# Patient Record
(demographics unavailable — no encounter records)

---

## 2025-04-25 NOTE — HISTORY OF PRESENT ILLNESS
[de-identified] : As per mom, pt has been having diarrhea since Saturday, and sounds congested. Decreased appetite  [FreeTextEntry6] : New patient, no significant PMHx or PSHx. Was seeing another pediatrician prior.    Symptoms started 4/19 Watery diarrhea, about 8x/day Mucous No blood Spits occasionally, no recent change Drinks formula, was taking 6.5 ounces, now taking 6 ounces, changed over the last few weeks Normal UOP No fever Congestion x1 month, started  a month ago, no recent changes Mom notes stomach bug was going around at

## 2025-04-25 NOTE — DISCUSSION/SUMMARY
[FreeTextEntry1] : - Discussed maintaining adequate hydration - Can try probiotic - Script given for GI PCR if diarrhea continues - Return PRN new or worsening symptoms

## 2025-04-25 NOTE — HISTORY OF PRESENT ILLNESS
[de-identified] : As per mom, pt has been having diarrhea since Saturday, and sounds congested. Decreased appetite  [FreeTextEntry6] : New patient, no significant PMHx or PSHx. Was seeing another pediatrician prior.    Symptoms started 4/19 Watery diarrhea, about 8x/day Mucous No blood Spits occasionally, no recent change Drinks formula, was taking 6.5 ounces, now taking 6 ounces, changed over the last few weeks Normal UOP No fever Congestion x1 month, started  a month ago, no recent changes Mom notes stomach bug was going around at

## 2025-05-06 NOTE — PHYSICAL EXAM
[Alert] : alert [Normocephalic] : normocephalic [Flat Open Anterior [place]] : flat open anterior fontanelle [Red Reflex] : red reflex bilateral [PERRL] : PERRL [Normally Placed Ears] : normally placed ears [Auricles Well Formed] : auricles well formed [Clear Tympanic membranes] : clear tympanic membranes [Light reflex present] : light reflex present [Bony landmarks visible] : bony landmarks visible [Nares Patent] : nares patent [Palate Intact] : palate intact [Uvula Midline] : uvula midline [Symmetric Chest Rise] : symmetric chest rise [Clear to Auscultation Bilaterally] : clear to auscultation bilaterally [Regular Rate and Rhythm] : regular rate and rhythm [S1, S2 present] : S1, S2 present [+2 Femoral Pulses] : (+) 2 femoral pulses [Soft] : soft [Bowel Sounds] : bowel sounds present [Central Urethral Opening] : central urethral opening [Testicles Descended] : testicles descended bilaterally [Patent] : patent [Normally Placed] : normally placed [No Abnormal Lymph Nodes Palpated] : no abnormal lymph nodes palpated [Startle Reflex] : startle reflex present [Plantar Grasp] : plantar grasp reflex present [Symmetric Lexx] : symmetric lexx [Acute Distress] : no acute distress [Discharge] : no discharge [Palpable Masses] : no palpable masses [Murmurs] : no murmurs [Tender] : nontender [Distended] : nondistended [Hepatomegaly] : no hepatomegaly [Splenomegaly] : no splenomegaly [Rosado-Ortolani] : negative Rosado-Ortolani [Allis Sign] : negative Allis sign [Spinal Dimple] : no spinal dimple [Tuft of Hair] : no tuft of hair [de-identified] : bifid gluteal crease [de-identified] : atopic patches to torso, cheeks, + seborrhea to scalp

## 2025-05-06 NOTE — HISTORY OF PRESENT ILLNESS
[Mother] : mother [Well-balanced] : well-balanced [Fruits] : fruits [Vegetables] : vegetables [Cereal] : cereal [Normal] : Normal [In Bassinet/Crib] : sleeps in bassinet/crib [Tummy time] : tummy time [No] : No cigarette smoke exposure [Water heater temperature set at <120 degrees F] : Water heater temperature set at <120 degrees F [Rear facing car seat in back seat] : Rear facing car seat in back seat [Carbon Monoxide Detectors] : Carbon monoxide detectors at home [Smoke Detectors] : Smoke detectors at home. [Formula ___ oz/feed] : [unfilled] oz of formula per feed [Co-sleeping] : no co-sleeping [Loose bedding, pillow, toys, and/or bumpers in crib] : no loose bedding, pillow, toys, and/or bumpers in crib [FreeTextEntry7] : 4 Month WCC [FreeTextEntry1] : Term infant, 39week, , [place], passed OAE and CCHD, per mom new concern: mom c/o eye d/c this AM- seems crusted and stuck together, no juliocesar of lacrimal duct stenosis, no fevers, + congestion, eating well, normal voiding, attends .

## 2025-05-06 NOTE — DISCUSSION/SUMMARY
[] : The components of the vaccine(s) to be administered today are listed in the plan of care. The disease(s) for which the vaccine(s) are intended to prevent and the risks have been discussed with the caretaker.  The risks are also included in the appropriate vaccination information statements which have been provided to the patient's caregiver.  The caregiver has given consent to vaccinate. [FreeTextEntry1] : Recommend breastfeeding, 8-12 feedings per day. Mother should continue prenatal vitamins and avoid alcohol. If formula is needed, recommend iron-fortified formulations, 2-4 oz every 3-4 hrs. Cereal may be introduced using a spoon and bowl. When in car, patient should be in rear-facing car seat in back seat. Put baby to sleep on back, in own crib with no loose or soft bedding. Lower crib matress. Help baby to maintain sleep and feeding routines. May offer pacifier if needed. Continue tummy time when awake. bifid gluteal crease- advise spinal US as below.  D/W caregiver atopic dermatitis; reviewed advise lotions/soaps and detergents without scent or dye; apply Aquaphor or Eucerin head to toe after bath time; topical hydrocortisone 1% BID OTC steroid to active patches only; monitor and call if further concern for recheck. D/W caregiver benign eye exam currently- will cover for conjunctivitis with antibiotic as below; reviewed supportive care including antipyretics as needs, keep well hydrated; monitor for eyelid swelling, difficulty moving the eye, worsening redness and call if occurring for recheck. time spent: 20min

## 2025-05-24 NOTE — HISTORY OF PRESENT ILLNESS
[de-identified] : Wet cough for 1 week on and off. Worsening pass 2 days. No fever [FreeTextEntry6] : Baby is here today for cough and congestion.  The cough sounds a little worse and wet.  No fevers. Drinking fine and wetting diapers.  Both parents have colds now too.

## 2025-05-24 NOTE — DISCUSSION/SUMMARY
[FreeTextEntry1] : . Recommend supportive care including humidifier, fluids, and nasal saline followed by nasal suction. Return if symptoms worsen or persist.

## 2025-06-03 NOTE — REVIEW OF SYSTEMS
[Fever] : no fever [Nasal Congestion] : no nasal congestion [Cough] : no cough [Vomiting] : no vomiting [Diarrhea] : no diarrhea [Rash] : rash

## 2025-06-03 NOTE — DISCUSSION/SUMMARY
[FreeTextEntry1] : D/W caregiver atopic dermatitis; reviewed advise lotions/soaps and detergents without scent or dye; apply Aquaphor or Eucerin head to toe after bath time, bacitracin to open patch; call with concerns.  time spent; 20min

## 2025-06-03 NOTE — HISTORY OF PRESENT ILLNESS
[de-identified] : exposure to coxsackie at . Mom noticed spots on patients belly, arms and legs  [FreeTextEntry6] : c/o spot on leg today- exposure to coxsackie at ; no congestion or cough, no ST, no ear pain, no n/v/c/d, eating and drinking well, normal voiding. afebrile. juliocesar of eczema

## 2025-06-03 NOTE — PHYSICAL EXAM
[NL] : normotonic [de-identified] : + atopic dermatitis patches to UE, LE and abdomen. + open 0.5cm patch to posterior right leg, no rash to palms or soles b/l

## 2025-06-04 NOTE — HISTORY OF PRESENT ILLNESS
[de-identified] : Dad states pt was exposed to coxsackie at . pt has blisters all over body. No fever. Dad states pt is eating well  [FreeTextEntry6] :  parent c/o rash- pt was seen yesterday with atopic dermatitis and isolated single open patch to right thigh. today- no congestion or cough, no ST, no ear pain, no n/v/c/d, eating and drinking well, normal voiding. afebrile. + exposure to coxsackie virus at  no cold sores in family, no MRSA infection

## 2025-06-04 NOTE — HISTORY OF PRESENT ILLNESS
[de-identified] : Dad states pt was exposed to coxsackie at . pt has blisters all over body. No fever. Dad states pt is eating well  [FreeTextEntry6] :  parent c/o rash- pt was seen yesterday with atopic dermatitis and isolated single open patch to right thigh. today- no congestion or cough, no ST, no ear pain, no n/v/c/d, eating and drinking well, normal voiding. afebrile. + exposure to coxsackie virus at  no cold sores in family, no MRSA infection

## 2025-06-07 NOTE — PHYSICAL EXAM
[NL] : normotonic [FreeTextEntry4] : mild congestion [de-identified] : dried up papular lesions trunk, UE LE

## 2025-06-07 NOTE — DISCUSSION/SUMMARY
[FreeTextEntry1] : post nasal drip discussed patting on back NS drops humidifier discussed if any cyanosis to er  lesions now dried up- possible this was coxsackie although never had sore throat/ or lesions now afebrile and dried up- not contagious

## 2025-06-07 NOTE — HISTORY OF PRESENT ILLNESS
[de-identified] : Mom states she noticed the patient start choking on his saliva this morning. Mom states pt. was silent, their face was red and it looked like they were "trying to cough". Mom burped the patient and he spit up. Mom states this happened again today while in the waiting room. Pt. was not drinking a bottle. Mom states pt. has cold like symptoms and feels his cough is getting worse and sounds more congested.  [FreeTextEntry6] : awoke this morning coughing mom patted him on back and mucous came out no cyanosis was fine then has been coughing/ congested does attend  was seen for rash earlier in week says rash is drying up eating fine

## 2025-07-11 NOTE — PHYSICAL EXAM
[Alert] : alert [Acute Distress] : no acute distress [Normocephalic] : normocephalic [Flat Open Anterior [place]] : flat open anterior fontanelle [Red Reflex] : red reflex bilateral [PERRL] : PERRL [Normally Placed Ears] : normally placed ears [Auricles Well Formed] : auricles well formed [Clear Tympanic membranes] : clear tympanic membranes [Light reflex present] : light reflex present [Bony landmarks visible] : bony landmarks visible [Discharge] : no discharge [Nares Patent] : nares patent [Palate Intact] : palate intact [Uvula Midline] : uvula midline [Tooth Eruption] : no tooth eruption [Supple, full passive range of motion] : supple, full passive range of motion [Palpable Masses] : no palpable masses [Symmetric Chest Rise] : symmetric chest rise [Clear to Auscultation Bilaterally] : clear to auscultation bilaterally [Regular Rate and Rhythm] : regular rate and rhythm [S1, S2 present] : S1, S2 present [Murmurs] : no murmurs [+2 Femoral Pulses] : (+) 2 femoral pulses [Soft] : soft [Tender] : nontender [Distended] : nondistended [Bowel Sounds] : bowel sounds present [Hepatomegaly] : no hepatomegaly [Splenomegaly] : no splenomegaly [Central Urethral Opening] : central urethral opening [Testicles Descended] : testicles descended bilaterally [Patent] : patent [Normally Placed] : normally placed [No Abnormal Lymph Nodes Palpated] : no abnormal lymph nodes palpated [Rosado-Ortolani] : negative Rosado-Ortolani [Allis Sign] : negative Allis sign [Symmetric Buttocks Creases] : symmetric buttocks creases [Spinal Dimple] : no spinal dimple [Tuft of Hair] : no tuft of hair [Plantar Grasp] : plantar grasp reflex present [Cranial Nerves Grossly Intact] : cranial nerves grossly intact [de-identified] : no masses [de-identified] : extensive atopic dermatitis- generalized dry skin, very inflamed in flexural areas- b/l antecubital spaces, b/l axilla, b/l inguinal folds, behind knees

## 2025-07-11 NOTE — DISCUSSION/SUMMARY
[Normal Growth] : growth [Normal Development] : development [None] : No medical problems [No Elimination Concerns] : elimination [No Feeding Concerns] : feeding [No Skin Concerns] : skin [Normal Sleep Pattern] : sleep [Family Functioning] : family functioning [Nutrition and Feeding] : nutrition and feeding [Infant Development] : infant development [Oral Health] : oral health [Safety] : safety [No Medications] : ~He/She~ is not on any medications [Parent/Guardian] : parent/guardian [] : The components of the vaccine(s) to be administered today are listed in the plan of care. The disease(s) for which the vaccine(s) are intended to prevent and the risks have been discussed with the caretaker.  The risks are also included in the appropriate vaccination information statements which have been provided to the patient's caregiver.  The caregiver has given consent to vaccinate. [FreeTextEntry1] : 6mo M seen for WCC. Normal exam except for extensive atopic dermatitis. Hydrocortisone 2.5%, skin care reviewed. Vaccines as per schedule. [place] and [place] reviewed. RTO 3mo for 9mo WCC.

## 2025-07-11 NOTE — HISTORY OF PRESENT ILLNESS
[Normal] : Normal [In Bassinet/Crib] : sleeps in bassinet/crib [Sleeps 12-16 hours per 24 hours (including naps)] : sleeps 12-16 hours per 24 hours (including naps) [Loose bedding, pillow, toys, and/or bumpers in crib] : no loose bedding, pillow, toys, and/or bumpers in crib [Tummy time] : tummy time [No] : No cigarette smoke exposure [Rear facing car seat in back seat] : Rear facing car seat in back seat [Carbon Monoxide Detectors] : Carbon monoxide detectors at home [Smoke Detectors] : Smoke detectors at home. [de-identified] : 6 Month WCC  [de-identified] : [place] and solids

## 2025-07-26 NOTE — DISCUSSION/SUMMARY
[FreeTextEntry1] : Exam c/w AD, not coxsackshahnaz Continue supportive care, emollients May return to

## 2025-07-26 NOTE — PHYSICAL EXAM
[NL] : normotonic [de-identified] : generally dry skin with some erythema in skin folds and diaper area consistent with atopic dermatitis, single small pustule R hand

## 2025-07-26 NOTE — HISTORY OF PRESENT ILLNESS
[de-identified] : mom reports pt with red spots x2 days. mom denies any other symptoms. pt afebrile. [FreeTextEntry6] : Sent home from  for lesion on finger 2 days ago Classmate with coxsackie No rash elsewhere, does have dry skin/eczema at baseline No fever, cough, congestion, diarrhea Normal PO Did vomit x1 yesterday after eating a banana